# Patient Record
Sex: MALE | Race: WHITE | Employment: FULL TIME | URBAN - METROPOLITAN AREA
[De-identification: names, ages, dates, MRNs, and addresses within clinical notes are randomized per-mention and may not be internally consistent; named-entity substitution may affect disease eponyms.]

---

## 2018-07-31 ENCOUNTER — ANESTHESIA (OUTPATIENT)
Dept: SURGERY | Age: 53
End: 2018-07-31
Payer: COMMERCIAL

## 2018-07-31 ENCOUNTER — APPOINTMENT (OUTPATIENT)
Dept: CT IMAGING | Age: 53
End: 2018-07-31
Attending: PHYSICIAN ASSISTANT
Payer: COMMERCIAL

## 2018-07-31 ENCOUNTER — HOSPITAL ENCOUNTER (OUTPATIENT)
Age: 53
Setting detail: OBSERVATION
Discharge: HOME | End: 2018-08-02
Attending: EMERGENCY MEDICINE | Admitting: SURGERY
Payer: COMMERCIAL

## 2018-07-31 ENCOUNTER — ANESTHESIA EVENT (OUTPATIENT)
Dept: SURGERY | Age: 53
End: 2018-07-31
Payer: COMMERCIAL

## 2018-07-31 DIAGNOSIS — K35.80 ACUTE APPENDICITIS, UNSPECIFIED ACUTE APPENDICITIS TYPE: Primary | ICD-10-CM

## 2018-07-31 DIAGNOSIS — K35.30 ACUTE APPENDICITIS WITH LOCALIZED PERITONITIS: ICD-10-CM

## 2018-07-31 PROBLEM — K37 APPENDICITIS: Status: ACTIVE | Noted: 2018-07-31

## 2018-07-31 LAB
ALBUMIN SERPL-MCNC: 3.9 G/DL (ref 3.5–5)
ALBUMIN/GLOB SERPL: 1.1 {RATIO} (ref 1.1–2.2)
ALP SERPL-CCNC: 54 U/L (ref 45–117)
ALT SERPL-CCNC: 61 U/L (ref 12–78)
ANION GAP SERPL CALC-SCNC: 5 MMOL/L (ref 5–15)
APPEARANCE UR: CLEAR
AST SERPL-CCNC: 24 U/L (ref 15–37)
BACTERIA URNS QL MICRO: NEGATIVE /HPF
BASOPHILS # BLD: 0 K/UL (ref 0–0.1)
BASOPHILS NFR BLD: 0 % (ref 0–1)
BILIRUB SERPL-MCNC: 1.4 MG/DL (ref 0.2–1)
BILIRUB UR QL: NEGATIVE
BUN SERPL-MCNC: 12 MG/DL (ref 6–20)
BUN/CREAT SERPL: 11 (ref 12–20)
CALCIUM SERPL-MCNC: 9.1 MG/DL (ref 8.5–10.1)
CHLORIDE SERPL-SCNC: 101 MMOL/L (ref 97–108)
CO2 SERPL-SCNC: 30 MMOL/L (ref 21–32)
COLOR UR: ABNORMAL
CREAT SERPL-MCNC: 1.11 MG/DL (ref 0.7–1.3)
DIFFERENTIAL METHOD BLD: ABNORMAL
EOSINOPHIL # BLD: 0 K/UL (ref 0–0.4)
EOSINOPHIL NFR BLD: 0 % (ref 0–7)
EPITH CASTS URNS QL MICRO: ABNORMAL /LPF
ERYTHROCYTE [DISTWIDTH] IN BLOOD BY AUTOMATED COUNT: 11.6 % (ref 11.5–14.5)
GLOBULIN SER CALC-MCNC: 3.7 G/DL (ref 2–4)
GLUCOSE SERPL-MCNC: 132 MG/DL (ref 65–100)
GLUCOSE UR STRIP.AUTO-MCNC: NEGATIVE MG/DL
HCT VFR BLD AUTO: 42.8 % (ref 36.6–50.3)
HGB BLD-MCNC: 15.2 G/DL (ref 12.1–17)
HGB UR QL STRIP: ABNORMAL
HYALINE CASTS URNS QL MICRO: ABNORMAL /LPF (ref 0–5)
IMM GRANULOCYTES # BLD: 0.1 K/UL (ref 0–0.04)
IMM GRANULOCYTES NFR BLD AUTO: 0 % (ref 0–0.5)
KETONES UR QL STRIP.AUTO: ABNORMAL MG/DL
LEUKOCYTE ESTERASE UR QL STRIP.AUTO: NEGATIVE
LIPASE SERPL-CCNC: 106 U/L (ref 73–393)
LYMPHOCYTES # BLD: 0.8 K/UL (ref 0.8–3.5)
LYMPHOCYTES NFR BLD: 5 % (ref 12–49)
MCH RBC QN AUTO: 31.3 PG (ref 26–34)
MCHC RBC AUTO-ENTMCNC: 35.5 G/DL (ref 30–36.5)
MCV RBC AUTO: 88.1 FL (ref 80–99)
MONOCYTES # BLD: 1.4 K/UL (ref 0–1)
MONOCYTES NFR BLD: 8 % (ref 5–13)
NEUTS SEG # BLD: 14.8 K/UL (ref 1.8–8)
NEUTS SEG NFR BLD: 86 % (ref 32–75)
NITRITE UR QL STRIP.AUTO: NEGATIVE
NRBC # BLD: 0 K/UL (ref 0–0.01)
NRBC BLD-RTO: 0 PER 100 WBC
PH UR STRIP: 6 [PH] (ref 5–8)
PLATELET # BLD AUTO: 164 K/UL (ref 150–400)
PMV BLD AUTO: 9.8 FL (ref 8.9–12.9)
POTASSIUM SERPL-SCNC: 3.8 MMOL/L (ref 3.5–5.1)
PROT SERPL-MCNC: 7.6 G/DL (ref 6.4–8.2)
PROT UR STRIP-MCNC: 30 MG/DL
RBC # BLD AUTO: 4.86 M/UL (ref 4.1–5.7)
RBC #/AREA URNS HPF: ABNORMAL /HPF (ref 0–5)
SODIUM SERPL-SCNC: 136 MMOL/L (ref 136–145)
SP GR UR REFRACTOMETRY: 1.02 (ref 1–1.03)
UR CULT HOLD, URHOLD: NORMAL
UROBILINOGEN UR QL STRIP.AUTO: 1 EU/DL (ref 0.2–1)
WBC # BLD AUTO: 17.2 K/UL (ref 4.1–11.1)
WBC URNS QL MICRO: ABNORMAL /HPF (ref 0–4)

## 2018-07-31 PROCEDURE — 74011250636 HC RX REV CODE- 250/636: Performed by: SURGERY

## 2018-07-31 PROCEDURE — 96361 HYDRATE IV INFUSION ADD-ON: CPT

## 2018-07-31 PROCEDURE — 77030018836 HC SOL IRR NACL ICUM -A: Performed by: SURGERY

## 2018-07-31 PROCEDURE — 81001 URINALYSIS AUTO W/SCOPE: CPT | Performed by: PHYSICIAN ASSISTANT

## 2018-07-31 PROCEDURE — 96365 THER/PROPH/DIAG IV INF INIT: CPT

## 2018-07-31 PROCEDURE — 74011250636 HC RX REV CODE- 250/636

## 2018-07-31 PROCEDURE — 74011250636 HC RX REV CODE- 250/636: Performed by: PHYSICIAN ASSISTANT

## 2018-07-31 PROCEDURE — 77030008771 HC TU NG SALEM SUMP -A: Performed by: ANESTHESIOLOGY

## 2018-07-31 PROCEDURE — 77030008684 HC TU ET CUF COVD -B: Performed by: ANESTHESIOLOGY

## 2018-07-31 PROCEDURE — 96375 TX/PRO/DX INJ NEW DRUG ADDON: CPT

## 2018-07-31 PROCEDURE — 77030008602 HC TRCR ENDOSC EPATH J&J -B: Performed by: SURGERY

## 2018-07-31 PROCEDURE — 74011000258 HC RX REV CODE- 258: Performed by: PHYSICIAN ASSISTANT

## 2018-07-31 PROCEDURE — 77030020704 HC DISECT ENDOSC BLNT J&J -B: Performed by: SURGERY

## 2018-07-31 PROCEDURE — 99284 EMERGENCY DEPT VISIT MOD MDM: CPT

## 2018-07-31 PROCEDURE — 77030002933 HC SUT MCRYL J&J -A: Performed by: SURGERY

## 2018-07-31 PROCEDURE — 74011636320 HC RX REV CODE- 636/320: Performed by: RADIOLOGY

## 2018-07-31 PROCEDURE — 76060000033 HC ANESTHESIA 1 TO 1.5 HR: Performed by: SURGERY

## 2018-07-31 PROCEDURE — 76010000149 HC OR TIME 1 TO 1.5 HR: Performed by: SURGERY

## 2018-07-31 PROCEDURE — 99218 HC RM OBSERVATION: CPT

## 2018-07-31 PROCEDURE — 77030039266 HC ADH SKN EXOFIN S2SG -A: Performed by: SURGERY

## 2018-07-31 PROCEDURE — 96376 TX/PRO/DX INJ SAME DRUG ADON: CPT

## 2018-07-31 PROCEDURE — 74177 CT ABD & PELVIS W/CONTRAST: CPT

## 2018-07-31 PROCEDURE — 83690 ASSAY OF LIPASE: CPT | Performed by: PHYSICIAN ASSISTANT

## 2018-07-31 PROCEDURE — 77030018004 HC RELD STPLR ENDOSC1 J&J -C: Performed by: SURGERY

## 2018-07-31 PROCEDURE — 77030008756 HC TU IRR SUC STRY -B: Performed by: SURGERY

## 2018-07-31 PROCEDURE — 80053 COMPREHEN METABOLIC PANEL: CPT | Performed by: PHYSICIAN ASSISTANT

## 2018-07-31 PROCEDURE — 36415 COLL VENOUS BLD VENIPUNCTURE: CPT | Performed by: EMERGENCY MEDICINE

## 2018-07-31 PROCEDURE — 77030011810 HC STPLR ENDOSC J&J -G: Performed by: SURGERY

## 2018-07-31 PROCEDURE — 77030032490 HC SLV COMPR SCD KNE COVD -B: Performed by: SURGERY

## 2018-07-31 PROCEDURE — 77030003580 HC NDL INSUF VERES J&J -B: Performed by: SURGERY

## 2018-07-31 PROCEDURE — 77030008603 HC TRCR ENDOSC EPATH J&J -C: Performed by: SURGERY

## 2018-07-31 PROCEDURE — 77030019908 HC STETH ESOPH SIMS -A: Performed by: ANESTHESIOLOGY

## 2018-07-31 PROCEDURE — 85025 COMPLETE CBC W/AUTO DIFF WBC: CPT | Performed by: PHYSICIAN ASSISTANT

## 2018-07-31 PROCEDURE — 76210000006 HC OR PH I REC 0.5 TO 1 HR: Performed by: SURGERY

## 2018-07-31 PROCEDURE — 77030031139 HC SUT VCRL2 J&J -A: Performed by: SURGERY

## 2018-07-31 PROCEDURE — 74011000250 HC RX REV CODE- 250

## 2018-07-31 PROCEDURE — 77030011640 HC PAD GRND REM COVD -A: Performed by: SURGERY

## 2018-07-31 RX ORDER — OXYCODONE AND ACETAMINOPHEN 5; 325 MG/1; MG/1
1 TABLET ORAL
Status: DISCONTINUED | OUTPATIENT
Start: 2018-07-31 | End: 2018-08-01

## 2018-07-31 RX ORDER — ACETAMINOPHEN 325 MG/1
650 TABLET ORAL
Status: DISCONTINUED | OUTPATIENT
Start: 2018-07-31 | End: 2018-08-02 | Stop reason: HOSPADM

## 2018-07-31 RX ORDER — BISMUTH SUBSALICYLATE 262 MG
1 TABLET,CHEWABLE ORAL DAILY
COMMUNITY

## 2018-07-31 RX ORDER — OXYCODONE AND ACETAMINOPHEN 5; 325 MG/1; MG/1
1-2 TABLET ORAL
Status: DISCONTINUED | OUTPATIENT
Start: 2018-07-31 | End: 2018-07-31

## 2018-07-31 RX ORDER — HYDROMORPHONE HYDROCHLORIDE 2 MG/ML
INJECTION, SOLUTION INTRAMUSCULAR; INTRAVENOUS; SUBCUTANEOUS
Status: COMPLETED
Start: 2018-07-31 | End: 2018-07-31

## 2018-07-31 RX ORDER — ONDANSETRON 2 MG/ML
4 INJECTION INTRAMUSCULAR; INTRAVENOUS
Status: DISCONTINUED | OUTPATIENT
Start: 2018-07-31 | End: 2018-08-02 | Stop reason: HOSPADM

## 2018-07-31 RX ORDER — SODIUM CHLORIDE, SODIUM LACTATE, POTASSIUM CHLORIDE, CALCIUM CHLORIDE 600; 310; 30; 20 MG/100ML; MG/100ML; MG/100ML; MG/100ML
125 INJECTION, SOLUTION INTRAVENOUS CONTINUOUS
Status: DISCONTINUED | OUTPATIENT
Start: 2018-07-31 | End: 2018-08-02 | Stop reason: HOSPADM

## 2018-07-31 RX ORDER — HYDROMORPHONE HYDROCHLORIDE 2 MG/ML
0.5 INJECTION, SOLUTION INTRAMUSCULAR; INTRAVENOUS; SUBCUTANEOUS
Status: COMPLETED | OUTPATIENT
Start: 2018-07-31 | End: 2018-07-31

## 2018-07-31 RX ORDER — PROPOFOL 10 MG/ML
INJECTION, EMULSION INTRAVENOUS AS NEEDED
Status: DISCONTINUED | OUTPATIENT
Start: 2018-07-31 | End: 2018-08-01 | Stop reason: HOSPADM

## 2018-07-31 RX ORDER — ACETAMINOPHEN 325 MG/1
650 TABLET ORAL
COMMUNITY
End: 2018-08-02

## 2018-07-31 RX ORDER — MORPHINE SULFATE 4 MG/ML
4 INJECTION INTRAVENOUS
Status: COMPLETED | OUTPATIENT
Start: 2018-07-31 | End: 2018-07-31

## 2018-07-31 RX ORDER — OXYCODONE AND ACETAMINOPHEN 5; 325 MG/1; MG/1
2 TABLET ORAL
Status: DISCONTINUED | OUTPATIENT
Start: 2018-07-31 | End: 2018-08-01

## 2018-07-31 RX ORDER — SUCCINYLCHOLINE CHLORIDE 20 MG/ML
INJECTION INTRAMUSCULAR; INTRAVENOUS AS NEEDED
Status: DISCONTINUED | OUTPATIENT
Start: 2018-07-31 | End: 2018-08-01 | Stop reason: HOSPADM

## 2018-07-31 RX ORDER — HYDROMORPHONE HYDROCHLORIDE 2 MG/ML
2 INJECTION, SOLUTION INTRAMUSCULAR; INTRAVENOUS; SUBCUTANEOUS
Status: DISCONTINUED | OUTPATIENT
Start: 2018-07-31 | End: 2018-08-01

## 2018-07-31 RX ORDER — HYDROMORPHONE HYDROCHLORIDE 2 MG/ML
1 INJECTION, SOLUTION INTRAMUSCULAR; INTRAVENOUS; SUBCUTANEOUS
Status: COMPLETED | OUTPATIENT
Start: 2018-07-31 | End: 2018-07-31

## 2018-07-31 RX ORDER — SODIUM CHLORIDE, SODIUM LACTATE, POTASSIUM CHLORIDE, CALCIUM CHLORIDE 600; 310; 30; 20 MG/100ML; MG/100ML; MG/100ML; MG/100ML
150 INJECTION, SOLUTION INTRAVENOUS CONTINUOUS
Status: DISCONTINUED | OUTPATIENT
Start: 2018-08-01 | End: 2018-08-01

## 2018-07-31 RX ORDER — ROCURONIUM BROMIDE 10 MG/ML
INJECTION, SOLUTION INTRAVENOUS AS NEEDED
Status: DISCONTINUED | OUTPATIENT
Start: 2018-07-31 | End: 2018-08-01 | Stop reason: HOSPADM

## 2018-07-31 RX ORDER — ONDANSETRON 2 MG/ML
4 INJECTION INTRAMUSCULAR; INTRAVENOUS
Status: DISPENSED | OUTPATIENT
Start: 2018-07-31 | End: 2018-08-01

## 2018-07-31 RX ORDER — FENTANYL CITRATE 50 UG/ML
INJECTION, SOLUTION INTRAMUSCULAR; INTRAVENOUS AS NEEDED
Status: DISCONTINUED | OUTPATIENT
Start: 2018-07-31 | End: 2018-08-01 | Stop reason: HOSPADM

## 2018-07-31 RX ORDER — MIDAZOLAM HYDROCHLORIDE 1 MG/ML
INJECTION, SOLUTION INTRAMUSCULAR; INTRAVENOUS AS NEEDED
Status: DISCONTINUED | OUTPATIENT
Start: 2018-07-31 | End: 2018-08-01 | Stop reason: HOSPADM

## 2018-07-31 RX ORDER — LIDOCAINE HYDROCHLORIDE 20 MG/ML
INJECTION, SOLUTION EPIDURAL; INFILTRATION; INTRACAUDAL; PERINEURAL AS NEEDED
Status: DISCONTINUED | OUTPATIENT
Start: 2018-07-31 | End: 2018-08-01 | Stop reason: HOSPADM

## 2018-07-31 RX ADMIN — SODIUM CHLORIDE 3.38 G: 900 INJECTION, SOLUTION INTRAVENOUS at 18:53

## 2018-07-31 RX ADMIN — FENTANYL CITRATE 50 MCG: 50 INJECTION, SOLUTION INTRAMUSCULAR; INTRAVENOUS at 23:42

## 2018-07-31 RX ADMIN — FENTANYL CITRATE 100 MCG: 50 INJECTION, SOLUTION INTRAMUSCULAR; INTRAVENOUS at 23:45

## 2018-07-31 RX ADMIN — HYDROMORPHONE HYDROCHLORIDE 0.5 MG: 2 INJECTION, SOLUTION INTRAMUSCULAR; INTRAVENOUS; SUBCUTANEOUS at 20:18

## 2018-07-31 RX ADMIN — ROCURONIUM BROMIDE 25 MG: 10 INJECTION, SOLUTION INTRAVENOUS at 23:45

## 2018-07-31 RX ADMIN — MORPHINE SULFATE 4 MG: 4 INJECTION INTRAVENOUS at 16:26

## 2018-07-31 RX ADMIN — SODIUM CHLORIDE, SODIUM LACTATE, POTASSIUM CHLORIDE, AND CALCIUM CHLORIDE 125 ML/HR: 600; 310; 30; 20 INJECTION, SOLUTION INTRAVENOUS at 20:37

## 2018-07-31 RX ADMIN — HYDROMORPHONE HYDROCHLORIDE 0.5 MG: 2 INJECTION, SOLUTION INTRAMUSCULAR; INTRAVENOUS; SUBCUTANEOUS at 23:45

## 2018-07-31 RX ADMIN — FENTANYL CITRATE 50 MCG: 50 INJECTION, SOLUTION INTRAMUSCULAR; INTRAVENOUS at 23:34

## 2018-07-31 RX ADMIN — IOPAMIDOL 100 ML: 755 INJECTION, SOLUTION INTRAVENOUS at 18:08

## 2018-07-31 RX ADMIN — ROCURONIUM BROMIDE 5 MG: 10 INJECTION, SOLUTION INTRAVENOUS at 23:40

## 2018-07-31 RX ADMIN — LIDOCAINE HYDROCHLORIDE 40 MG: 20 INJECTION, SOLUTION EPIDURAL; INFILTRATION; INTRACAUDAL; PERINEURAL at 23:40

## 2018-07-31 RX ADMIN — HYDROMORPHONE HYDROCHLORIDE 2 MG: 2 INJECTION, SOLUTION INTRAMUSCULAR; INTRAVENOUS; SUBCUTANEOUS at 22:12

## 2018-07-31 RX ADMIN — SODIUM CHLORIDE, SODIUM LACTATE, POTASSIUM CHLORIDE, AND CALCIUM CHLORIDE: 600; 310; 30; 20 INJECTION, SOLUTION INTRAVENOUS at 23:30

## 2018-07-31 RX ADMIN — SUCCINYLCHOLINE CHLORIDE 100 MG: 20 INJECTION INTRAMUSCULAR; INTRAVENOUS at 23:40

## 2018-07-31 RX ADMIN — SODIUM CHLORIDE 1000 ML: 900 INJECTION, SOLUTION INTRAVENOUS at 16:30

## 2018-07-31 RX ADMIN — MIDAZOLAM HYDROCHLORIDE 2 MG: 1 INJECTION, SOLUTION INTRAMUSCULAR; INTRAVENOUS at 23:34

## 2018-07-31 RX ADMIN — HYDROMORPHONE HYDROCHLORIDE 0.5 MG: 2 INJECTION, SOLUTION INTRAMUSCULAR; INTRAVENOUS; SUBCUTANEOUS at 18:50

## 2018-07-31 RX ADMIN — PROPOFOL 200 MG: 10 INJECTION, EMULSION INTRAVENOUS at 23:40

## 2018-07-31 RX ADMIN — HYDROMORPHONE HYDROCHLORIDE 1 MG: 2 INJECTION, SOLUTION INTRAMUSCULAR; INTRAVENOUS; SUBCUTANEOUS at 17:14

## 2018-07-31 NOTE — IP AVS SNAPSHOT
[address] 
[phone] Patient: Namita Rosas MRN: [id number] :1965 A check kanchan indicates which time of day the medication should be taken. My Medications START taking these medications Instructions Each Dose to Equal  
 Morning Noon Evening Bedtime  
 amoxicillin-clavulanate 875-125 mg per tablet Commonly known as:  AUGMENTIN Take 1 Tab by mouth two (2) times a day. 1 Tab  
    
   
   
   
  
 oxyCODONE-acetaminophen 5-325 mg per tablet Commonly known as:  PERCOCET Your last dose was:  2018 
6:38am  
   
 Take 1-2 Tabs by mouth every four (4) hours as needed for Pain. Max Daily Amount: 12 Tabs. 1-2 Tab CONTINUE taking these medications Instructions Each Dose to Equal  
 Morning Noon Evening Bedtime  
 multivitamin tablet Commonly known as:  ONE A DAY Take 1 Tab by mouth daily. 1 Tab STOP taking these medications   
 acetaminophen 325 mg tablet Commonly known as:  TYLENOL Where to Get Your Medications Information on where to get these meds will be given to you by the nurse or doctor. ! Ask your nurse or doctor about these medications  
  amoxicillin-clavulanate 875-125 mg per tablet  
 oxyCODONE-acetaminophen 5-325 mg per tablet

## 2018-07-31 NOTE — PROGRESS NOTES
[facility]: MED RECONCILIATION Information obtained from: patient, no Rx query on file Significant PMH/Disease States: History reviewed. No pertinent past medical history. Chief Complaint for this Admission: Chief Complaint Patient presents with  Abdominal Pain Allergies: Review of patient's allergies indicates no known allergies. Prior to Admission Medications:  
 
Medication Documentation Review Audit Reviewed by Kassandra Cheek PHARMD (Pharmacist) on 07/31/18 at [address] [place] [address] Medication Sig Documenting Provider Last Dose Status Taking?  
 
 acetaminophen (TYLENOL) 325 mg tablet Take 650 mg by mouth every four (4) hours as needed for Pain. Historical Provider 7/31/2018 Unknown time Active Yes  
 multivitamin (ONE A DAY) tablet Take 1 Tab by mouth daily. Historical Provider 7/30/2018 am Active Yes Thank you for the consult, 
Wyatt WHIPPLE, [address]

## 2018-07-31 NOTE — IP AVS SNAPSHOT
[facility] 
 
 
 [address] 
[phone] Patient: Christian Yuan MRN: [id number] :1965 About your hospitalization You were admitted on:  2018 You last received care in the:  [facility] 4M POST SURG ORT 1 You were discharged on:  2018 Why you were hospitalized Your primary diagnosis was:  Not on File Your diagnoses also included:  Appendicitis Follow-up Information Follow up With Details Comments Contact Info Jeff Whipple MD In 2 weeks  [place] [address] 
[phone] None   None ([phone]) Patient stated that they have no PCP Discharge Orders None A check kanchan indicates which time of day the medication should be taken. My Medications START taking these medications Instructions Each Dose to Equal  
 Morning Noon Evening Bedtime  
 amoxicillin-clavulanate 875-125 mg per tablet Commonly known as:  AUGMENTIN Take 1 Tab by mouth two (2) times a day. 1 Tab  
    
   
   
   
  
 oxyCODONE-acetaminophen 5-325 mg per tablet Commonly known as:  PERCOCET Your last dose was:  2018 
6:38am  
   
 Take 1-2 Tabs by mouth every four (4) hours as needed for Pain. Max Daily Amount: 12 Tabs. 1-2 Tab CONTINUE taking these medications Instructions Each Dose to Equal  
 Morning Noon Evening Bedtime  
 multivitamin tablet Commonly known as:  ONE A DAY Take 1 Tab by mouth daily. 1 Tab STOP taking these medications   
 acetaminophen 325 mg tablet Commonly known as:  TYLENOL Where to Get Your Medications Information on where to get these meds will be given to you by the nurse or doctor. ! Ask your nurse or doctor about these medications  
  amoxicillin-clavulanate 875-125 mg per tablet oxyCODONE-acetaminophen 5-325 mg per tablet Opioid Education Prescription Opioids: What You Need to Know: 
 
  
FOLLOW-UP: Please make an appointment with your physician in 2 week(s). Call your physician immediately if you have any fevers greater than 102.5, drainage from your wound that is not clear or looks infected, persistent bleeding, increasing abdominal pain, problems urinating, or persistent nausea/vomiting. You should be aware that you may have shoulder pain after surgery and that this will progressively go away. This is called 'referred pain' and is caused by gas that may be trapped under the diaphragm from the surgery, especially if it was performed laparoscopically through mini-incisions. This gas will progressively get reabsorbed by your body.  
  
WOUND CARE INSTRUCTIONS:  Dermabond liquid skin bandage applied. You may shower. Do not scrape off. Once the wound has quit draining you may leave it open to air. If clothing rubs against the wound or causes irritation and the wound is not draining you may cover it with a dry dressing during the daytime. Try to keep the wound dry and avoid ointments on the wound unless directed to do so. If the wound becomes bright red and painful or starts to drain infected material that is not clear, please contact your physician immediately. You should also call if you begin to drain fluid that is thin and greenish-brown from the wound and appears to look like bile. If the wound though is mildly pink and has a thick firm ridge underneath it, this is normal, and is referred to as a healing ridge.  This will resolve over the next 4-6 weeks. 
  
 DIET: You may eat any foods that you can tolerate. It is a good idea to eat a high fiber diet and take in plenty of fluids to prevent constipation. If you do become constipated you may want to take a mild laxative or take ducolax tablets on a daily basis until your bowel habits are regular. Constipation can be very uncomfortable, along with straining, after recent abdominal surgery. Can take Miralax 1 cap full daily for constipation. 
  
ACTIVITY: You are encouraged to cough and deep breath or use your incentive spirometer if you were given one, every 15-30 minutes when awake. This will help prevent respiratory complications and low grade fevers post-operatively. You may want to hug a pillow when coughing and sneezing to add additional support to the surgical area(s) which will decrease pain during these times. You are encouraged to walk and engage in light activity for the next two weeks. You should not lift more than 20 pounds during this time frame as it could put you at increased risk for a post-operative hernia. Twenty pounds is roughly equivalent to a plastic bag of groceries.  
  
MEDICATIONS: Try to take narcotic medications and anti-inflammatory medications, such as tylenol, ibuprofen, naprosyn, etc., with food. This will minimize stomach upset from the medication. Should you develop nausea and vomiting from the pain medication, or develop a rash, please discontinue the medication and contact your physician. You should not drive, make important decisions, or operate machinery when taking narcotic pain medication. 
  
QUESTIONS: Please feel free to call your physician or the hospital  if you have any questions, and they will be glad to assist you. [employer] Announcement We are excited to announce that we are making your provider's discharge notes available to you in [employer].   You will see these notes when they are completed and signed by the physician that discharged you from your recent hospital stay. If you have any questions or concerns about any information you see in [employer], please call the Health Information Department where you were seen or reach out to your Primary Care Provider for more information about your plan of care. Introducing [facility]! Sundayreynabrittani Jenniferleopoldo introduces [employer] patient portal. Now you can access parts of your medical record, email your doctor's office, and request medication refills online. 1. In your internet browser, go to [email or web address] 2. Click on the First Time User? Click Here link in the Sign In box. You will see the New Member Sign Up page. 3. Enter your [employer] Access Code exactly as it appears below. You will not need to use this code after youve completed the sign-up process. If you do not sign up before the expiration date, you must request a new code. · [employer] Access Code: [email or web address] Expires: 10/29/2018  4:12 PM 
 
4. Enter the last four digits of your Social Security Number (xxxx) and Date of Birth (mm/dd/yyyy) as indicated and click Submit. You will be taken to the next sign-up page. 5. Create a [employer] ID. This will be your [employer] login ID and cannot be changed, so think of one that is secure and easy to remember. 6. Create a [employer] password. You can change your password at any time. 7. Enter your Password Reset Question and Answer. This can be used at a later time if you forget your password. 8. Enter your e-mail address. You will receive e-mail notification when new information is available in [address] 9. Click Sign Up. You can now view and download portions of your medical record. 10. Click the Download Summary menu link to download a portable copy of your medical information.  
 
If you have questions, please visit the Frequently Asked Questions section of the [employer]. Remember, MyChart is NOT to be used for urgent needs. For medical emergencies, dial 911. Now available from your iPhone and Android! Introducing Benito Bedoya As a [facility] patient, I wanted to make you aware of our electronic visit tool called [facility]. [facility] 24/7 allows you to connect within minutes with a medical provider 24 hours a day, seven days a week via a mobile device or tablet or logging into a secure website from your computer. You can access Benito Bedoya from anywhere in the [place]. A virtual visit might be right for you when you have a simple condition and feel like you just dont want to get out of bed, or cant get away from work for an appointment, when your regular [facility] provider is not available (evenings, weekends or holidays), or when youre out of town and need minor care. Electronic visits cost only $49 and if the [facility] 24/7 provider determines a prescription is needed to treat your condition, one can be electronically transmitted to a nearby pharmacy*. Please take a moment to enroll today if you have not already done so. The enrollment process is free and takes just a few minutes. To enroll, please download the [facility] 24/7 rick to your tablet or phone, or visit [email or web address] to enroll on your computer. And, as an [address] [place] [address] patient with a Fuel ([email or web address]) account, the results of your visits will be scanned into your electronic medical record and your primary care provider will be able to view the scanned results. We urge you to continue to see your regular [facility] provider for your ongoing medical care. And while your primary care provider may not be the one available when you seek a Benito Bedoya virtual visit, the peace of mind you get from getting a real diagnosis real time can be priceless. For more information on Benito Bedoya, view our Frequently Asked Questions (FAQs) at [email or web address]. Sincerely, 
 
Marielle Lara MD 
Chief Medical Officer [employer] *:  certain medications cannot be prescribed via Benito Bedoya Providers Seen During Your Hospitalization Provider Specialty Primary office phone Cris Platt MD Emergency Medicine [phone] Hermilo Roberts, [address] [place] [address] Surgery [phone] He Awad MD General Surgery [phone] Your Primary Care Physician (PCP) Primary Care Physician Office Phone Office Fax NONE ** None ** ** None ** You are allergic to the following No active allergies Recent Documentation Height Weight BMI Smoking Status 1.753 m 95.3 kg 31.01 kg/m2 Never Smoker Emergency Contacts Name Discharge Info Relation Home Work Sponto   [phone] Patient Belongings The following personal items are in your possession at time of discharge: 
  Dental Appliances: None  Visual Aid: None Please provide this summary of care documentation to your next provider. Signatures-by signing, you are acknowledging that this After Visit Summary has been reviewed with you and you have received a copy. Patient Signature:  ____________________________________________________________ Date:  ____________________________________________________________  
  
Renetta Dallas Provider Signature:  ____________________________________________________________ Date:  ____________________________________________________________

## 2018-07-31 NOTE — ED TRIAGE NOTES
Pt c/o RLQ pain since last night. Denies N/v/d. Pt went to [facility] and was told to come to ED. Pt took nothing for pain PTA.

## 2018-07-31 NOTE — ED PROVIDER NOTES
HPI Comments: Marla De León is a 47 y.o. male  who presents by private vehicle to ER with c/o Patient presents with: 
Abdominal Pain Patient presents with complaints of RLQ abdominal pain that started yesterday. Patient reports nausea and vomiting. Denies diarrhea. Patient reports subjective fever. Patient denies any significant medical or surgical history. He specifically denies any  chest pain, shortness of breath, headache, rash, diarrhea,  urinary/bowel changes, sweating or weight loss. PCP: No primary care provider on file. PMHx significant for: History reviewed. No pertinent past medical history. PSHx significant for: History reviewed. No pertinent surgical history. Social Hx: Tobacco use: Smoking status: Never Smoker Smokeless status: Never Used                     
; EtOH use: The patient states he drinks 0 per week.; Illicit Drug use: Allergies: 
No Known Allergies There are no other complaints, changes or physical findings at this time. Patient is a 47 y.o. male presenting with abdominal pain. The history is provided by the patient. Abdominal Pain This is a new problem. The current episode started yesterday. The problem occurs constantly. The problem has not changed since onset. The pain is located in the RLQ. The quality of the pain is sharp. The pain is at a severity of 9/10. The pain is severe. Associated symptoms include a fever, nausea and vomiting. Pertinent negatives include no anorexia, no belching, no diarrhea, no flatus, no hematochezia, no melena, no dysuria, no frequency, no hematuria, no headaches, no arthralgias, no myalgias, no trauma, no chest pain, no testicular pain and no back pain. Nothing worsens the pain. The pain is relieved by nothing. His past medical history does not include GERD, Crohn's disease, cancer, UTI or ectopic pregnancy. The patient's surgical history non-contributory.  
  
 
History reviewed. No pertinent past medical history. History reviewed. No pertinent surgical history. History reviewed. No pertinent family history. Social History Social History  Marital status: N/A Spouse name: N/A  
 Number of children: N/A  
 Years of education: N/A Occupational History  Not on file. Social History Main Topics  Smoking status: Never Smoker  Smokeless tobacco: Never Used  Alcohol use Yes Comment: social  
 Drug use: No  
 Sexual activity: Not on file Other Topics Concern  Not on file Social History Narrative  No narrative on file ALLERGIES: Review of patient's allergies indicates no known allergies. Review of Systems Constitutional: Positive for fever. HENT: Negative. Eyes: Negative. Respiratory: Negative. Cardiovascular: Negative. Negative for chest pain. Gastrointestinal: Positive for abdominal pain, nausea and vomiting. Negative for anorexia, diarrhea, flatus, hematochezia and melena. Endocrine: Negative. Genitourinary: Negative. Negative for dysuria, frequency, hematuria and testicular pain. Musculoskeletal: Negative. Negative for arthralgias, back pain and myalgias. Skin: Negative. Allergic/Immunologic: Negative. Neurological: Negative. Negative for headaches. Hematological: Negative. Psychiatric/Behavioral: Negative. All other systems reviewed and are negative. Vitals:  
 07/31/18 1611 BP: (!) 141/93 Pulse: 85 Resp: 16 Temp: 98.7 °F (37.1 °C) SpO2: 98% Weight: 49.9 kg (110 lb) Height: 5' 9\" (1.753 m) Physical Exam  
Constitutional: He is oriented to person, place, and time. He appears well-developed and well-nourished. HENT:  
Head: Normocephalic and atraumatic. Right Ear: External ear normal.  
Left Ear: External ear normal.  
Mouth/Throat: Oropharynx is clear and moist. No oropharyngeal exudate.   
Eyes: Conjunctivae and EOM are normal. Pupils are equal, round, and reactive to light. Right eye exhibits no discharge. Left eye exhibits no discharge. No scleral icterus. Neck: Normal range of motion. Neck supple. No tracheal deviation present. No thyromegaly present. Cardiovascular: Normal rate, regular rhythm, normal heart sounds and intact distal pulses. No murmur heard. Pulmonary/Chest: Effort normal and breath sounds normal. No respiratory distress. He has no wheezes. He has no rales. Abdominal: Soft. Bowel sounds are normal. He exhibits no distension. There is tenderness in the right lower quadrant. There is no rebound and no guarding. Musculoskeletal: Normal range of motion. He exhibits no edema or tenderness. Lymphadenopathy:  
  He has no cervical adenopathy. Neurological: He is alert and oriented to person, place, and time. No cranial nerve deficit. Coordination normal.  
Skin: Skin is warm. No rash noted. No erythema. Psychiatric: He has a normal mood and affect. His behavior is normal. Judgment and thought content normal.  
Nursing note and vitals reviewed. MDM Number of Diagnoses or Management Options Acute appendicitis, unspecified acute appendicitis type:  
Diagnosis management comments: 12:33 AM 
Patient is being admitted to the hospital.  The results of their tests and reasons for their admission have been discussed with them and/or available family. They convey agreement and understanding for the need to be admitted and for their admission diagnosis. Consultation has been made with the inpatient physician specialist for hospitalization. LABORATORY TESTS: 
Recent Results (from the past 12 hour(s)) -CBC WITH AUTOMATED DIFF Collection Time: 07/31/18  4:23 PM 
     Result                                            Value                         Ref Range      WBC                                               17.2 (H)                      4.1 - 11.1 K/uL           
     RBC 4.86                          4.10 - 5.70 M/uL HGB                                               15.2                          12.1 - 17.0 g/dL HCT                                               42.8                          36.6 - 50.3 % MCV                                               88.1                          80.0 - 99.0 FL            
     MCH                                               31.3                          26.0 - 34.0 PG            
     MCHC                                              35.5                          30.0 - 36.5 g/dL RDW                                               11.6                          11.5 - 14.5 % PLATELET                                          164                           150 - 400 K/uL MPV                                               9.8                           8.9 - 12.9 FL             
     NRBC                                              0.0                           0  WBC ABSOLUTE NRBC                                     0.00                          0.00 - 0.01 K/uL NEUTROPHILS                                       86 (H)                        32 - 75 % LYMPHOCYTES                                       5 (L)                         12 - 49 % MONOCYTES                                         8                             5 - 13 % EOSINOPHILS                                       0                             0 - 7 % BASOPHILS                                         0                             0 - 1 % IMMATURE GRANULOCYTES                             0                             0.0 - 0.5 % ABS.  NEUTROPHILS                                  14.8 (H) 1.8 - 8.0 K/UL            
     ABS. LYMPHOCYTES                                  0.8                           0.8 - 3.5 K/UL            
     ABS. MONOCYTES                                    1.4 (H)                       0.0 - 1.0 K/UL            
     ABS. EOSINOPHILS                                  0.0                           0.0 - 0.4 K/UL            
     ABS. BASOPHILS                                    0.0                           0.0 - 0.1 K/UL            
     ABS. IMM. GRANS.                                  0.1 (H)                       0.00 - 0.04 K/UL          
     DF                                                AUTOMATED                                               
-METABOLIC PANEL, COMPREHENSIVE Collection Time: 07/31/18  4:23 PM 
     Result                                            Value                         Ref Range Sodium                                            136                           136 - 145 mmol/L Potassium                                         3.8                           3.5 - 5.1 mmol/L Chloride                                          101                           97 - 108 mmol/L           
     CO2                                               30                            21 - 32 mmol/L Anion gap                                         5                             5 - 15 mmol/L Glucose                                           132 (H)                       65 - 100 mg/dL BUN                                               12                            6 - 20 MG/DL Creatinine                                        1.11                          0.70 - 1.30 MG/DL         
     BUN/Creatinine ratio                              11 (L)                        12 - 20      GFR est AA                                        >60 >60 ml/min/1.73m2 GFR est non-AA                                    >60                           >60 ml/min/1.73m2 Calcium                                           9.1                           8.5 - 10.1 MG/DL Bilirubin, total                                  1.4 (H)                       0.2 - 1.0 MG/DL           
     ALT (SGPT)                                        61                            12 - 78 U/L               
     AST (SGOT)                                        24                            15 - 37 U/L Alk. phosphatase                                  54                            45 - 117 U/L Protein, total                                    7.6                           6.4 - 8.2 g/dL Albumin                                           3.9                           3.5 - 5.0 g/dL Globulin                                          3.7                           2.0 - 4.0 g/dL A-G Ratio                                         1.1                           1.1 - 2.2                 
-LIPASE Collection Time: 07/31/18  4:23 PM 
     Result                                            Value                         Ref Range Lipase                                            106                           73 - 393 U/L              
-URINALYSIS W/MICROSCOPIC Collection Time: 07/31/18  6:55 PM 
     Result                                            Value                         Ref Range Color                                             YELLOW/STRAW Appearance                                        CLEAR                         CLEAR      Specific gravity                                  1.020                         1.003 - 1.030 pH (UA)                                           6.0                           5.0 - 8.0 Protein                                           30 (A)                        NEG mg/dL Glucose                                           NEGATIVE                      NEG mg/dL Ketone                                            TRACE (A)                     NEG mg/dL Bilirubin                                         NEGATIVE                      NEG Blood                                             MODERATE (A)                  NEG Urobilinogen                                      1.0                           0.2 - 1.0 EU/dL Nitrites                                          NEGATIVE                      NEG Leukocyte Esterase                                NEGATIVE                      NEG                       
     WBC                                               0-4                           0 - 4 /hpf                
     RBC                                               20-50                         0 - 5 /hpf Epithelial cells                                  FEW                           FEW /lpf Bacteria                                          NEGATIVE                      NEG /hpf Hyaline cast                                      0-2                           0 - 5 /lpf                
-URINE CULTURE HOLD SAMPLE Collection Time: 07/31/18  6:55 PM 
     Result                                            Value                         Ref Range Urine culture hold URINE ON HOLD IN MICROBIOLOGY DEPT FOR 3 DAYS.  IF UNPRESERVED URINE IS SUBMITTED, IT CANNOT BE USED FOR ADDITIONAL TESTING AFTER 24 HRS, RECOLLECTION WILL BE REQUIRED. IMAGING RESULTS: 
See chart MEDICATIONS GIVEN: 
Medications 
ondansetron (ZOFRAN) injection 4 mg (4 mg IntraVENous Refused 7/31/18 1622) lactated Ringers infusion ( IntraVENous New Bag 7/31/18 2330) HYDROmorphone (PF) (DILAUDID) injection 2 mg (2 mg IntraVENous Given 7/31/18 2212) 
acetaminophen (TYLENOL) tablet 650 mg (not administered) 
ondansetron (ZOFRAN) injection 4 mg (not administered) 
oxyCODONE-acetaminophen (PERCOCET) 5-325 mg per tablet 1 Tab (not administered) Or 
oxyCODONE-acetaminophen (PERCOCET) 5-325 mg per tablet 2 Tab (not administered) 
piperacillin-tazobactam (ZOSYN) 3.375 g in 0.9% sodium chloride (MBP/ADV) 100 mL ADV (not administered) 
sodium chloride 0.9 % bolus infusion 1,000 mL (0 mL IntraVENous IV Completed 7/31/18 1923) morphine injection 4 mg (4 mg IntraVENous Given 7/31/18 1626) HYDROmorphone (PF) (DILAUDID) injection 1 mg (1 mg IntraVENous Given 7/31/18 1714) iopamidol (ISOVUE-370) 76 % injection 100 mL (100 mL IntraVENous Given 7/31/18 1808) piperacillin-tazobactam (ZOSYN) 3.375 g in 0.9% sodium chloride (MBP/ADV) 100 mL ADV (3.375 g IntraVENous Given 7/31/18 1853) HYDROmorphone (PF) (DILAUDID) injection 0.5 mg (0.5 mg IntraVENous Given 7/31/18 1850) HYDROmorphone (PF) (DILAUDID) injection 0.5 mg (0.5 mg IntraVENous Given 7/31/18 2018) bupivacaine-EPINEPHrine (PF) (SENSORCAINE PF) 0.5 %-1:200,000 injection 150 mg (30 mL SubCUTAneous Given 8/1/18 0020) IMPRESSION: 
Acute appendicitis, unspecified acute appendicitis type  (primary encounter diagnosis) PLAN: 
1. Admit to hospital for appendicitis. Amount and/or Complexity of Data Reviewed Clinical lab tests: ordered and reviewed Tests in the radiology section of CPT®: reviewed and ordered Tests in the medicine section of CPT®: ordered and reviewed Discuss the patient with other providers: yes (Attending- Dr. Carlos Arroyo who agrees with plan) ED Course Procedures CONSULT NOTE:  
7:18 PM 
Reshma Brewster PA-C spoke with Dr. Gretchen hCapin, Specialty: General Surgery Discussed pt's hx, disposition, and available diagnostic and imaging results. Reviewed care plans. Consultant agrees with plans as outlined. Will see for admission.

## 2018-08-01 PROCEDURE — 74011250636 HC RX REV CODE- 250/636: Performed by: SURGERY

## 2018-08-01 PROCEDURE — 74011250637 HC RX REV CODE- 250/637: Performed by: SURGERY

## 2018-08-01 PROCEDURE — 74011000250 HC RX REV CODE- 250: Performed by: SURGERY

## 2018-08-01 PROCEDURE — 74011000258 HC RX REV CODE- 258: Performed by: SURGERY

## 2018-08-01 PROCEDURE — 99218 HC RM OBSERVATION: CPT

## 2018-08-01 PROCEDURE — 77030027138 HC INCENT SPIROMETER -A

## 2018-08-01 PROCEDURE — 77010033678 HC OXYGEN DAILY

## 2018-08-01 PROCEDURE — 74011250636 HC RX REV CODE- 250/636

## 2018-08-01 PROCEDURE — 88304 TISSUE EXAM BY PATHOLOGIST: CPT | Performed by: SURGERY

## 2018-08-01 PROCEDURE — 94760 N-INVAS EAR/PLS OXIMETRY 1: CPT

## 2018-08-01 RX ORDER — DIPHENHYDRAMINE HYDROCHLORIDE 50 MG/ML
12.5 INJECTION, SOLUTION INTRAMUSCULAR; INTRAVENOUS AS NEEDED
Status: DISCONTINUED | OUTPATIENT
Start: 2018-08-01 | End: 2018-08-01 | Stop reason: HOSPADM

## 2018-08-01 RX ORDER — HYDROMORPHONE HYDROCHLORIDE 1 MG/ML
0.5 INJECTION, SOLUTION INTRAMUSCULAR; INTRAVENOUS; SUBCUTANEOUS
Status: DISCONTINUED | OUTPATIENT
Start: 2018-08-01 | End: 2018-08-01 | Stop reason: HOSPADM

## 2018-08-01 RX ORDER — DIPHENHYDRAMINE HYDROCHLORIDE 50 MG/ML
12.5 INJECTION, SOLUTION INTRAMUSCULAR; INTRAVENOUS
Status: DISCONTINUED | OUTPATIENT
Start: 2018-08-01 | End: 2018-08-02 | Stop reason: HOSPADM

## 2018-08-01 RX ORDER — HYDROMORPHONE HYDROCHLORIDE 2 MG/ML
INJECTION, SOLUTION INTRAMUSCULAR; INTRAVENOUS; SUBCUTANEOUS AS NEEDED
Status: DISCONTINUED | OUTPATIENT
Start: 2018-07-31 | End: 2018-08-01 | Stop reason: HOSPADM

## 2018-08-01 RX ORDER — ONDANSETRON 2 MG/ML
INJECTION INTRAMUSCULAR; INTRAVENOUS AS NEEDED
Status: DISCONTINUED | OUTPATIENT
Start: 2018-08-01 | End: 2018-08-01 | Stop reason: HOSPADM

## 2018-08-01 RX ORDER — HYDROMORPHONE HYDROCHLORIDE 2 MG/ML
0.5 INJECTION, SOLUTION INTRAMUSCULAR; INTRAVENOUS; SUBCUTANEOUS
Status: DISCONTINUED | OUTPATIENT
Start: 2018-08-01 | End: 2018-08-02 | Stop reason: HOSPADM

## 2018-08-01 RX ORDER — OXYCODONE AND ACETAMINOPHEN 5; 325 MG/1; MG/1
2 TABLET ORAL
Status: DISCONTINUED | OUTPATIENT
Start: 2018-08-01 | End: 2018-08-02 | Stop reason: HOSPADM

## 2018-08-01 RX ORDER — ONDANSETRON 2 MG/ML
4 INJECTION INTRAMUSCULAR; INTRAVENOUS AS NEEDED
Status: DISCONTINUED | OUTPATIENT
Start: 2018-08-01 | End: 2018-08-01 | Stop reason: HOSPADM

## 2018-08-01 RX ORDER — SODIUM CHLORIDE 0.9 % (FLUSH) 0.9 %
5-10 SYRINGE (ML) INJECTION AS NEEDED
Status: DISCONTINUED | OUTPATIENT
Start: 2018-08-01 | End: 2018-08-02 | Stop reason: HOSPADM

## 2018-08-01 RX ORDER — BUPIVACAINE HYDROCHLORIDE AND EPINEPHRINE 5; 5 MG/ML; UG/ML
30 INJECTION, SOLUTION EPIDURAL; INTRACAUDAL; PERINEURAL ONCE
Status: COMPLETED | OUTPATIENT
Start: 2018-08-01 | End: 2018-08-01

## 2018-08-01 RX ORDER — KETOROLAC TROMETHAMINE 30 MG/ML
15 INJECTION, SOLUTION INTRAMUSCULAR; INTRAVENOUS EVERY 6 HOURS
Status: DISCONTINUED | OUTPATIENT
Start: 2018-08-01 | End: 2018-08-02

## 2018-08-01 RX ORDER — SODIUM CHLORIDE 0.9 % (FLUSH) 0.9 %
5-10 SYRINGE (ML) INJECTION EVERY 8 HOURS
Status: DISCONTINUED | OUTPATIENT
Start: 2018-08-01 | End: 2018-08-02 | Stop reason: HOSPADM

## 2018-08-01 RX ORDER — SODIUM CHLORIDE, SODIUM LACTATE, POTASSIUM CHLORIDE, CALCIUM CHLORIDE 600; 310; 30; 20 MG/100ML; MG/100ML; MG/100ML; MG/100ML
125 INJECTION, SOLUTION INTRAVENOUS CONTINUOUS
Status: DISCONTINUED | OUTPATIENT
Start: 2018-08-01 | End: 2018-08-01 | Stop reason: HOSPADM

## 2018-08-01 RX ORDER — OXYCODONE AND ACETAMINOPHEN 5; 325 MG/1; MG/1
1 TABLET ORAL
Status: DISCONTINUED | OUTPATIENT
Start: 2018-08-01 | End: 2018-08-02 | Stop reason: HOSPADM

## 2018-08-01 RX ORDER — ALBUTEROL SULFATE 0.83 MG/ML
2.5 SOLUTION RESPIRATORY (INHALATION) AS NEEDED
Status: DISCONTINUED | OUTPATIENT
Start: 2018-08-01 | End: 2018-08-01 | Stop reason: HOSPADM

## 2018-08-01 RX ORDER — DOCUSATE SODIUM 100 MG/1
100 CAPSULE, LIQUID FILLED ORAL DAILY
Status: DISCONTINUED | OUTPATIENT
Start: 2018-08-01 | End: 2018-08-02 | Stop reason: HOSPADM

## 2018-08-01 RX ORDER — HEPARIN SODIUM 5000 [USP'U]/ML
5000 INJECTION, SOLUTION INTRAVENOUS; SUBCUTANEOUS EVERY 8 HOURS
Status: DISCONTINUED | OUTPATIENT
Start: 2018-08-01 | End: 2018-08-02 | Stop reason: HOSPADM

## 2018-08-01 RX ADMIN — OXYCODONE HYDROCHLORIDE AND ACETAMINOPHEN 1 TABLET: 5; 325 TABLET ORAL at 23:48

## 2018-08-01 RX ADMIN — HEPARIN SODIUM 5000 UNITS: 5000 INJECTION INTRAVENOUS; SUBCUTANEOUS at 18:39

## 2018-08-01 RX ADMIN — DOCUSATE SODIUM 100 MG: 100 CAPSULE, LIQUID FILLED ORAL at 10:36

## 2018-08-01 RX ADMIN — SODIUM CHLORIDE 3.38 G: 900 INJECTION, SOLUTION INTRAVENOUS at 01:15

## 2018-08-01 RX ADMIN — OXYCODONE HYDROCHLORIDE AND ACETAMINOPHEN 1 TABLET: 5; 325 TABLET ORAL at 17:11

## 2018-08-01 RX ADMIN — SODIUM CHLORIDE 3.38 G: 900 INJECTION, SOLUTION INTRAVENOUS at 17:11

## 2018-08-01 RX ADMIN — FENTANYL CITRATE 50 MCG: 50 INJECTION, SOLUTION INTRAMUSCULAR; INTRAVENOUS at 00:00

## 2018-08-01 RX ADMIN — SODIUM CHLORIDE 3.38 G: 900 INJECTION, SOLUTION INTRAVENOUS at 23:50

## 2018-08-01 RX ADMIN — OXYCODONE HYDROCHLORIDE AND ACETAMINOPHEN 1 TABLET: 5; 325 TABLET ORAL at 04:04

## 2018-08-01 RX ADMIN — KETOROLAC TROMETHAMINE 15 MG: 30 INJECTION, SOLUTION INTRAMUSCULAR at 11:48

## 2018-08-01 RX ADMIN — ONDANSETRON 4 MG: 2 INJECTION INTRAMUSCULAR; INTRAVENOUS at 00:00

## 2018-08-01 RX ADMIN — Medication 10 ML: at 23:49

## 2018-08-01 RX ADMIN — HYDROMORPHONE HYDROCHLORIDE 2 MG: 2 INJECTION, SOLUTION INTRAMUSCULAR; INTRAVENOUS; SUBCUTANEOUS at 09:06

## 2018-08-01 RX ADMIN — KETOROLAC TROMETHAMINE 15 MG: 30 INJECTION, SOLUTION INTRAMUSCULAR at 18:40

## 2018-08-01 RX ADMIN — SODIUM CHLORIDE, SODIUM LACTATE, POTASSIUM CHLORIDE, AND CALCIUM CHLORIDE 125 ML/HR: 600; 310; 30; 20 INJECTION, SOLUTION INTRAVENOUS at 07:30

## 2018-08-01 RX ADMIN — HEPARIN SODIUM 5000 UNITS: 5000 INJECTION INTRAVENOUS; SUBCUTANEOUS at 10:36

## 2018-08-01 RX ADMIN — KETOROLAC TROMETHAMINE 15 MG: 30 INJECTION, SOLUTION INTRAMUSCULAR at 23:49

## 2018-08-01 RX ADMIN — SODIUM CHLORIDE 3.38 G: 900 INJECTION, SOLUTION INTRAVENOUS at 09:06

## 2018-08-01 RX ADMIN — OXYCODONE HYDROCHLORIDE AND ACETAMINOPHEN 1 TABLET: 5; 325 TABLET ORAL at 11:50

## 2018-08-01 RX ADMIN — OXYCODONE HYDROCHLORIDE AND ACETAMINOPHEN 1 TABLET: 5; 325 TABLET ORAL at 20:28

## 2018-08-01 RX ADMIN — SODIUM CHLORIDE, SODIUM LACTATE, POTASSIUM CHLORIDE, AND CALCIUM CHLORIDE 125 ML/HR: 600; 310; 30; 20 INJECTION, SOLUTION INTRAVENOUS at 15:16

## 2018-08-01 NOTE — PROGRESS NOTES
General Surgery Progress Note S: Poor pain control. Not using incentive spirometer. Patient Vitals for the past 24 hrs: 
 Temp Pulse Resp BP SpO2  
08/01/18 0810 99 °F (37.2 °C) (!) 109 24 115/75 94 % 08/01/18 0448 98.7 °F (37.1 °C) 100 28 131/76 96 % 08/01/18 0346 99.8 °F (37.7 °C) 96 (!) 36 131/89 95 % 08/01/18 0245 99.4 °F (37.4 °C) (!) 104 24 127/82 94 % 08/01/18 0139 99.3 °F (37.4 °C) (!) 120 20 122/80 90 % 08/01/18 0126 - (!) 112 - 133/80 95 % 08/01/18 0120 100.2 °F (37.9 °C) (!) 110 18 134/87 93 % 08/01/18 0115 - (!) 106 18 133/80 96 % 08/01/18 0110 - (!) 106 20 - 97 % 08/01/18 0105 - (!) 107 23 140/85 96 % 08/01/18 0104 100.4 °F (38 °C) - - - -  
08/01/18 0100 - (!) 102 19 138/87 100 % 08/01/18 0055 - 96 22 143/83 98 % 08/01/18 0051 100 °F (37.8 °C) - - - -  
08/01/18 0050 - 91 20 142/82 100 % 08/01/18 0046 (!) 101.8 °F (38.8 °C) - - 147/86 -  
08/01/18 0045 - 84 28 149/85 94 % 08/01/18 0042 - - - 147/86 -  
07/31/18 2215 99 °F (37.2 °C) (!) 115 22 134/83 91 %  
07/31/18 2130 - - - (!) 146/101 92 % 07/31/18 2100 - - - 129/79 95 % 07/31/18 2043 - - - - 90 % 07/31/18 2030 - - - 139/85 91 %  
07/31/18 2000 - - - 134/82 93 % 07/31/18 1930 - - - 122/75 96 % 07/31/18 1900 - - - 121/73 95 % 07/31/18 1847 - - - 123/84 97 % 07/31/18 1830 - - - - 93 % 07/31/18 1745 - - - - 93 % 07/31/18 1700 - - - (!) 148/101 95 % 07/31/18 1630 - - - 141/82 97 % 07/31/18 1623 - - - 151/79 96 % 07/31/18 1611 98.7 °F (37.1 °C) 85 16 (!) 141/93 98 % Date 07/31/18 0700 - 08/01/18 6521 08/01/18 0700 - 08/02/18 6741 Shift 3161-2231 1912-9540 24 Hour Total 0160-1295 6030-0824 24 Hour Total  
I 
N 
T 
A 
K 
E 
 I.V. 
(mL/kg/hr)  1268.8 
(1.1) 1268.8 
(0.6) Volume (lactated Ringers infusion)  1268.8 1268.8 Shift Total 
(mL/kg)  1268.8 
(13.3) 1268.8 
(13.3) O 
U T 
P 
U Nae Carney Urine (mL/kg/hr)  50 
(0) 50 
(0) Urine Voided  50 50  Shift Total 
(mL/kg)  50 
(0.5) 50 
(0.5) NET  1218.8 1218.8 Weight (kg) 49.9 95.3 95.3 95.3 95.3 95.3 Physical Exam: 
 
 
General: NAD, A&Ox3 Resp: CTAB 
CV:Tachycardia, regular rhythm Abdomen: soft, distended, appropriately tender. Incisions without signs of infection Extremity: warm, no edema Lab Results Component Value Date/Time WBC 17.2 (H) 07/31/2018 04:23 PM  
 HGB 15.2 07/31/2018 04:23 PM  
 HCT 42.8 07/31/2018 04:23 PM  
 PLATELET 144 09/60/0767 04:23 PM  
 MCV 88.1 07/31/2018 04:23 PM  
 
 
Lab Results Component Value Date/Time Sodium 136 07/31/2018 04:23 PM  
 Potassium 3.8 07/31/2018 04:23 PM  
 Chloride 101 07/31/2018 04:23 PM  
 CO2 30 07/31/2018 04:23 PM  
 Anion gap 5 07/31/2018 04:23 PM  
 Glucose 132 (H) 07/31/2018 04:23 PM  
 BUN 12 07/31/2018 04:23 PM  
 Creatinine 1.11 07/31/2018 04:23 PM  
 BUN/Creatinine ratio 11 (L) 07/31/2018 04:23 PM  
 GFR est AA >60 07/31/2018 04:23 PM  
 GFR est non-AA >60 07/31/2018 04:23 PM  
 Calcium 9.1 07/31/2018 04:23 PM  
 
  
No results found for: INR, PTMR, PTP, PT1, PT2 
 
 
 
A/P: 
47year-old male POD 1 s/p laparoscopic appendectomy. Continue Percocet changed to q4 hours. Added Toradol. IS, RT c/s Regular diet as tolerated Colace Continue Zosyn for intra-abdominal infection. Per Dr. Justin Ruiz appendix was gangrenous Heparin for DVT chemoprophylaxis May shower Ambulate, Activity ad elie Shana Lobo MD

## 2018-08-01 NOTE — ANESTHESIA PREPROCEDURE EVALUATION
Anesthetic History No history of anesthetic complications Review of Systems / Medical History Patient summary reviewed, nursing notes reviewed and pertinent labs reviewed Pulmonary Within defined limits Neuro/Psych Within defined limits Cardiovascular Within defined limits Exercise tolerance: >4 METS 
  
GI/Hepatic/Renal 
  
 
 
 
 
 
Comments: Acute appendicitis Endo/Other Obesity Other Findings Physical Exam 
 
Airway Mallampati: II 
 
Neck ROM: normal range of motion Mouth opening: Normal 
 
 Cardiovascular Rhythm: regular Rate: normal 
 
 
 
 Dental 
No notable dental hx Pulmonary Breath sounds clear to auscultation Abdominal 
 
 
 
 Other Findings Anesthetic Plan ASA: 2, emergent Anesthesia type: general 
 
 
 
 
Induction: Intravenous Anesthetic plan and risks discussed with: Patient and Spouse Informed consent obtained.

## 2018-08-01 NOTE — BRIEF OP NOTE
BRIEF OPERATIVE NOTE Date of Procedure: 7/31/2018 Preoperative Diagnosis: Acute appendicitis, unspecified acute appendicitis type [K35.80] Postoperative Diagnosis: Acute appendicitis, unspecified acute appendicitis type [K35.80] Procedure(s): LAPAROSCOPIC APPENDECTOMY Surgeon(s) and Role: Rangel Hughes MD - Primary Surgical Assistant: Chely May Surgical Staff: 
Circ-1: Michael Bull RN Scrub Tech-1: Yamile Hawk Surg Asst-1: King Rizo Event Time In Incision Start 2350 Incision Close Anesthesia: General  
Estimated Blood Loss: 40cc Specimens:  
ID Type Source Tests Collected by Time Destination 1 : Brooks Tejeda MD 8/1/2018 8713 Pathology Findings: gangrenous appendicitis Complications: none Implants: * No implants in log *

## 2018-08-01 NOTE — OP NOTES
[facility] 80  OPERATIVE REPORT    Jose Wang  MR#: [id number]  : 1965  ACCOUNT #: [de-identified]   DATE OF SERVICE: 2018    PREOPERATIVE DIAGNOSIS:  Acute appendicitis. POSTOPERATIVE DIAGNOSIS:  Acute appendicitis. PROCEDURE PERFORMED:  Laparoscopic appendectomy. SURGEON:  Tsering Finley MD    ASSISTANT:  Leann Garcia    ANESTHESIA:  General.    POSTOPERATIVE CONDITION:  Good. ESTIMATED BLOOD LOSS:  Minimal.    COUNTS:  Sponge and needle counts were correct. DRAINS:  There were no drains. COMPLICATIONS:  No complications. IMPLANTS:  none    SPECIMENS REMOVED:  Appendix. INTRAOPERATIVE FINDINGS:  Gangrenous changes in the appendix throughout its length. INDICATION FOR PROCEDURE:  A 52-year-old  man who presents with history of abdominal exam and CT scan findings consistent with acute appendicitis. He is now to undergo laparoscopic, possible open appendectomy. DESCRIPTION OF PROCEDURE:  After appropriate informed consent was obtained, the patient was brought to operating room and placed in supine position. General anesthesia induced. The abdomen prepped and draped in the usual sterile fashion. Patient had been given Zosyn IV in the emergency room. No additional antibiotics warranted were given. After adequate pneumoperitoneum was gained with the Veress needle, a 12 mm supraumbilical trocar was inserted, underneath was surveyed to exclude occult bowel injury and subsequently a 5 mm suprapubic and a 5 mm left lower quadrant trocar was placed under direct vision. Dissection was begun in the right lower quadrant. The terminal ileum and omentum were adherent to the appendix. These were gently mobilized off of it using blunt dissection. The appendix was revealed, it was noted to be thickened, inflamed and to have some gangrenous changes.   It was gently elevated superiorly and medially and the mesoappendix dissected free and divided with a load on the Endo-YOSELIN 45 mm stapler with a vascular load. The appendix was then seen right where it met the tenia coli at the base of the cecum and it was divided with a second load which was a bowel load on the Endo-YOSELIN 45 mm stapler right where it met the base of the cecum. Specimen was placed in an EndoCatch pouch for later extraction and the right lower quadrant triply irrigated and checked carefully for hemostasis. Trocars removed. Pneumoperitoneum was relieved and the specimen was extracted through the umbilical trocar site in EndoCatch pouch and sent for pathology. Wounds reinspected for hemostasis. The fascia at the umbilicus was reapproximated using interrupted figure-of-eight sutures of 0 Vicryl. There was a small umbilical hernia. The peritoneal fat was freed from the edge of the fascial defect and reduced into the peritoneum and then the hernia space obliterated during closure of the fascia. Wounds were irrigated and checked for hemostasis and infiltrated with local anesthetic and then they were closed using running absorbable sutures. The patient was released from the recovery room in stable condition.       Thank you for your kind referral.      MD ANITRA Dalton / ALEJANDRO  D: 08/01/2018 00:29     T: 08/01/2018 05:50  JOB #: [id number]  CC: Twana Carrel PA-C

## 2018-08-01 NOTE — PROGRESS NOTES
8/1/2018 10:48 AM 
Reason for Admission:   Appendicitis RRAT Score:      0 Plan for utilizing home health:      No HH needs indicated. Likelihood of Readmission:  [place] Transition of Care Plan:                   
 
CM met with pt for assessment. Demographics and PCP were confirmed. Pt is a 47year old,  male who lives in a private residence with his wife (4 exterior steps, 1 flight interior steps). Pt's physical address is [place] [address]. PTA, pt was able to complete ADLs without the use of DME. Pt has prescription drug coverage. OBS letter provided and explained, and pt requested to be switched to inpatient as he has no outpatient coverage. Pt's wife will provide transport upon discharge. Adrian Victorai MA Care Management Interventions PCP Verified by CM: Yes (None reported. Pt reports he uses an urgent care) Palliative Care Criteria Met (RRAT>21 & CHF Dx)?: No 
Mode of Transport at Discharge: Other (see comment) (Wife) MyChart Signup: No 
Discharge Durable Medical Equipment: No 
Physical Therapy Consult: No 
Occupational Therapy Consult: No 
Speech Therapy Consult: No 
Current Support Network: Lives with Spouse Confirm Follow Up Transport: Family Plan discussed with Pt/Family/Caregiver: Yes Discharge Location Discharge Placement: Home with family assistance

## 2018-08-01 NOTE — PROGRESS NOTES
Patient c/o upper chest pain with inhalation, no radiation or any other symptoms, Dr. Yanira Martinez aware of patients symptoms, encouraged to use incentive spirometer, ambulation and patient to sit in chair, pain medications changed from Q6 to Q4hrs prn,  Symptoms related to gas, will continue to assess.

## 2018-08-01 NOTE — PERIOP NOTES
TRANSFER - OUT REPORT: 
 
Verbal report given to Aziza SR RN(name) on Gus Neal  being transferred to [facility](unit) for routine post - op Report consisted of patients Situation, Background, Assessment and  
Recommendations(SBAR). Information from the following report(s) OR Summary, Procedure Summary, Intake/Output and MAR was reviewed with the receiving nurse. Opportunity for questions and clarification was provided. Patient transported with: 
 O2 @ 4 liters Registered Nurse

## 2018-08-01 NOTE — PROGRESS NOTES
Bedside shift change report given to Palma Vee RN (oncoming nurse) by Jesu Sadler RN (offgoing nurse). Report included the following information SBAR, Kardex, Procedure Summary, Intake/Output, MAR and Recent Results. ]

## 2018-08-01 NOTE — PROGRESS NOTES
Bedside and Verbal shift change report given to [facility] (oncoming nurse) by ELIZABETH Haas (offgoing nurse). Report given with SBAR, Kardex, OR Summary, Intake/Output, MAR and Recent Results.

## 2018-08-01 NOTE — ANESTHESIA POSTPROCEDURE EVALUATION
Post-Anesthesia Evaluation and Assessment Patient: Lucie Tolbert MRN: [id number]  SSN: [id number] YOB: 1965  Age: 47 y.o. Sex: male Cardiovascular Function/Vital Signs Visit Vitals  /83  Pulse 96  Temp 37.8 °C (100 °F)  Resp 22  
 Ht 5' 9\" (1.753 m)  Wt 95.3 kg (210 lb)  SpO2 98%  BMI 31.01 kg/m2 Patient is status post general anesthesia for Procedure(s): LAPAROSCOPIC APPENDECTOMY . Nausea/Vomiting: None Postoperative hydration reviewed and adequate. Pain: 
Pain Scale 1: Visual (08/01/18 0046) Pain Intensity 1: 0 (08/01/18 0046) Managed Neurological Status:  
Neuro (WDL): Exceptions to WDL (Pt very sleepy on arrival to pacu) (08/01/18 0046) At baseline Mental Status and Level of Consciousness: Arousable Pulmonary Status:  
O2 Device: Oxygen mask (08/01/18 0051) Adequate oxygenation and airway patent Complications related to anesthesia: None Post-anesthesia assessment completed. No concerns Signed By: Jefferson Crouch DO August 1, 2018

## 2018-08-02 VITALS
RESPIRATION RATE: 18 BRPM | DIASTOLIC BLOOD PRESSURE: 58 MMHG | BODY MASS INDEX: 31.1 KG/M2 | WEIGHT: 210 LBS | SYSTOLIC BLOOD PRESSURE: 120 MMHG | HEIGHT: 69 IN | HEART RATE: 110 BPM | TEMPERATURE: 98.4 F | OXYGEN SATURATION: 91 %

## 2018-08-02 LAB
ANION GAP SERPL CALC-SCNC: 8 MMOL/L (ref 5–15)
BASOPHILS # BLD: 0 K/UL (ref 0–0.1)
BASOPHILS NFR BLD: 0 % (ref 0–1)
BUN SERPL-MCNC: 18 MG/DL (ref 6–20)
BUN/CREAT SERPL: 14 (ref 12–20)
CALCIUM SERPL-MCNC: 8.1 MG/DL (ref 8.5–10.1)
CHLORIDE SERPL-SCNC: 99 MMOL/L (ref 97–108)
CO2 SERPL-SCNC: 30 MMOL/L (ref 21–32)
CREAT SERPL-MCNC: 1.31 MG/DL (ref 0.7–1.3)
DIFFERENTIAL METHOD BLD: ABNORMAL
EOSINOPHIL # BLD: 0.2 K/UL (ref 0–0.4)
EOSINOPHIL NFR BLD: 2 % (ref 0–7)
ERYTHROCYTE [DISTWIDTH] IN BLOOD BY AUTOMATED COUNT: 11.6 % (ref 11.5–14.5)
GLUCOSE SERPL-MCNC: 119 MG/DL (ref 65–100)
HCT VFR BLD AUTO: 37.1 % (ref 36.6–50.3)
HGB BLD-MCNC: 12.7 G/DL (ref 12.1–17)
IMM GRANULOCYTES # BLD: 0 K/UL
IMM GRANULOCYTES NFR BLD AUTO: 0 %
LYMPHOCYTES # BLD: 0.9 K/UL (ref 0.8–3.5)
LYMPHOCYTES NFR BLD: 7 % (ref 12–49)
MAGNESIUM SERPL-MCNC: 1.8 MG/DL (ref 1.6–2.4)
MCH RBC QN AUTO: 31 PG (ref 26–34)
MCHC RBC AUTO-ENTMCNC: 34.2 G/DL (ref 30–36.5)
MCV RBC AUTO: 90.5 FL (ref 80–99)
MONOCYTES # BLD: 0.7 K/UL (ref 0–1)
MONOCYTES NFR BLD: 6 % (ref 5–13)
NEUTS BAND NFR BLD MANUAL: 1 % (ref 0–6)
NEUTS SEG # BLD: 10.6 K/UL (ref 1.8–8)
NEUTS SEG NFR BLD: 84 % (ref 32–75)
NRBC # BLD: 0 K/UL (ref 0–0.01)
NRBC BLD-RTO: 0 PER 100 WBC
PLATELET # BLD AUTO: 115 K/UL (ref 150–400)
PMV BLD AUTO: 10.3 FL (ref 8.9–12.9)
POTASSIUM SERPL-SCNC: 3.6 MMOL/L (ref 3.5–5.1)
RBC # BLD AUTO: 4.1 M/UL (ref 4.1–5.7)
RBC MORPH BLD: ABNORMAL
SODIUM SERPL-SCNC: 137 MMOL/L (ref 136–145)
WBC # BLD AUTO: 12.4 K/UL (ref 4.1–11.1)

## 2018-08-02 PROCEDURE — 74011250636 HC RX REV CODE- 250/636: Performed by: SURGERY

## 2018-08-02 PROCEDURE — 99218 HC RM OBSERVATION: CPT

## 2018-08-02 PROCEDURE — 80048 BASIC METABOLIC PNL TOTAL CA: CPT | Performed by: SURGERY

## 2018-08-02 PROCEDURE — 85025 COMPLETE CBC W/AUTO DIFF WBC: CPT | Performed by: SURGERY

## 2018-08-02 PROCEDURE — 83735 ASSAY OF MAGNESIUM: CPT | Performed by: SURGERY

## 2018-08-02 PROCEDURE — 74011250637 HC RX REV CODE- 250/637: Performed by: SURGERY

## 2018-08-02 PROCEDURE — 36415 COLL VENOUS BLD VENIPUNCTURE: CPT | Performed by: SURGERY

## 2018-08-02 PROCEDURE — 77010033678 HC OXYGEN DAILY

## 2018-08-02 PROCEDURE — 94760 N-INVAS EAR/PLS OXIMETRY 1: CPT

## 2018-08-02 RX ORDER — AMOXICILLIN AND CLAVULANATE POTASSIUM 875; 125 MG/1; MG/1
1 TABLET, FILM COATED ORAL 2 TIMES DAILY
Qty: 20 TAB | Refills: 0 | Status: SHIPPED | OUTPATIENT
Start: 2018-08-02

## 2018-08-02 RX ORDER — OXYCODONE AND ACETAMINOPHEN 5; 325 MG/1; MG/1
1-2 TABLET ORAL
Qty: 25 TAB | Refills: 0 | Status: SHIPPED | OUTPATIENT
Start: 2018-08-02

## 2018-08-02 RX ADMIN — DOCUSATE SODIUM 100 MG: 100 CAPSULE, LIQUID FILLED ORAL at 08:55

## 2018-08-02 RX ADMIN — Medication 10 ML: at 06:38

## 2018-08-02 RX ADMIN — KETOROLAC TROMETHAMINE 15 MG: 30 INJECTION, SOLUTION INTRAMUSCULAR at 06:38

## 2018-08-02 RX ADMIN — HEPARIN SODIUM 5000 UNITS: 5000 INJECTION INTRAVENOUS; SUBCUTANEOUS at 02:44

## 2018-08-02 RX ADMIN — OXYCODONE HYDROCHLORIDE AND ACETAMINOPHEN 1 TABLET: 5; 325 TABLET ORAL at 06:38

## 2018-08-02 NOTE — PROGRESS NOTES
General Surgery Progress Note S: Passing flatus. Pain controlled on current regimen. Off supplemental oxygen. Patient Vitals for the past 24 hrs: 
 Temp Pulse Resp BP SpO2  
08/02/18 0736 98.4 °F (36.9 °C) (!) 110 18 120/58 91 % 08/02/18 0400 98.5 °F (36.9 °C) 94 19 145/67 97 % 08/02/18 0302 - - - - 97 % 08/02/18 0000 98.6 °F (37 °C) (!) 110 18 149/84 95 % 08/01/18 2012 99.7 °F (37.6 °C) 100 20 133/86 92 % 08/01/18 1511 98.5 °F (36.9 °C) 97 24 108/74 97 % 08/01/18 1158 - - - - 99 % 08/01/18 1111 98.9 °F (37.2 °C) (!) 107 22 116/75 93 % 08/01/18 1012 - - - - 96 % Date 08/01/18 0700 - 08/02/18 7718 08/02/18 0700 - 08/03/18 6960 Shift 9069-9391 4269-6514 24 Hour Total 9012-0717 5798-1661 24 Hour Total  
I 
N 
T 
A 
K 
E 
 I.V. 
(mL/kg/hr) 770.8 
(0.7)  770.8 
(0.3) Volume (lactated Ringers infusion) 770.8  770.8 Shift Total 
(mL/kg) 770.8 
(8.1)  770.8 
(8.1) O 
U T 
P 
U [facility] Shift Total 
(mL/kg) .8  770.8 Weight (kg) 95.3 95.3 95.3 95.3 95.3 95.3 Physical Exam: 
 
 
General: NAD, A&Ox3 Resp: CTAB 
CV:Tachycardia, regular rhythm Abdomen: soft, distended, appropriately tender. Incisions without signs of infection Extremity: warm, no edema Lab Results Component Value Date/Time WBC 12.4 (H) 08/02/2018 04:14 AM  
 HGB 12.7 08/02/2018 04:14 AM  
 HCT 37.1 08/02/2018 04:14 AM  
 PLATELET 644 (L) 65/78/2570 04:14 AM  
 MCV 90.5 08/02/2018 04:14 AM  
 
 
Lab Results Component Value Date/Time  Sodium 137 08/02/2018 04:14 AM  
 Potassium 3.6 08/02/2018 04:14 AM  
 Chloride 99 08/02/2018 04:14 AM  
 CO2 30 08/02/2018 04:14 AM  
 Anion gap 8 08/02/2018 04:14 AM  
 Glucose 119 (H) 08/02/2018 04:14 AM  
 BUN 18 08/02/2018 04:14 AM  
 Creatinine 1.31 (H) 08/02/2018 04:14 AM  
 BUN/Creatinine ratio 14 08/02/2018 04:14 AM  
 GFR est AA >60 08/02/2018 04:14 AM  
 GFR est non-AA 58 (L) 08/02/2018 04:14 AM  
 Calcium 8.1 (L) 08/02/2018 04:14 AM  
 
  
No results found for: INR, PTMR, PTP, PT1, PT2 
 
 
 
A/P: 
47year-old male POD 2 s/p laparoscopic appendectomy. Continue Percocet changed to q4 hours. Added Toradol. IS, RT c/s Regular diet as tolerated Colace Will discharge on Augmentin for 10 days. Per Dr. Rocky Nichols appendix was gangrenous Heparin for DVT chemoprophylaxis May shower Ambulate, Activity ad elie Appropriate for discharge home with 2 week outpatient follow-up Dr. Rocky Ellison MD

## 2018-08-02 NOTE — PROGRESS NOTES
8/2/2018 
8:56 AM 
Pt discharge noted. CM reviewed EMR. No discharge service needs indicated. Pt's wife will provide transport upon discharge.

## 2018-08-02 NOTE — DISCHARGE SUMMARY
Discharge Summary     Patient: Alexi Stahl MRN: [id number]  SSN: [id number]    YOB: 1965  Age: 47 y.o. Sex: male       Admit Date: 7/31/2018    Discharge Date: 8/2/2018      Admission Diagnoses: Acute appendicitis, unspecified acute appendicitis type [K35.80]    Discharge Diagnoses:   Problem List as of 8/2/2018  Never Reviewed          Codes Class Noted - Resolved    Appendicitis ICD-10-CM: K37  ICD-9-CM: 782  7/31/2018 - Present               Discharge Condition: Good    Hospital Course: 47year-old male admitted with acute appendicitis, underwent laparoscopic appendectomy by Dr. Myron Kaplan on day of admission. Uncomplicated post-operative course. Was discharged home on 10 days of Augmentin. Was instructed to follow-up with Dr. Myron Kaplan in 2 weeks. Consults: None    Significant Diagnostic Studies: None    Disposition: home    Discharge Medications:   Current Discharge Medication List      START taking these medications    Details   amoxicillin-clavulanate (AUGMENTIN) 875-125 mg per tablet Take 1 Tab by mouth two (2) times a day. Qty: 20 Tab, Refills: 0    Associated Diagnoses: Acute appendicitis with localized peritonitis      oxyCODONE-acetaminophen (PERCOCET) 5-325 mg per tablet Take 1-2 Tabs by mouth every four (4) hours as needed for Pain. Max Daily Amount: 12 Tabs. Qty: 25 Tab, Refills: 0    Associated Diagnoses: Acute appendicitis with localized peritonitis         CONTINUE these medications which have NOT CHANGED    Details   multivitamin (ONE A DAY) tablet Take 1 Tab by mouth daily.          STOP taking these medications       acetaminophen (TYLENOL) 325 mg tablet Comments:   Reason for Stopping:               Activity: See surgical instructions  Diet: Regular Diet  Wound Care: None needed    Follow-up Appointments   Procedures    FOLLOW UP VISIT Appointment in: Two Weeks     Standing Status:   Standing     Number of Occurrences:   1     Order Specific Question: Appointment in     Answer:    Two Weeks       Signed By: India Apple MD     August 2, 2018

## 2018-08-02 NOTE — DISCHARGE INSTRUCTIONS
PATIENT INSTRUCTIONS  APPENDIX SURGERY  (Appendectomy)     FOLLOW-UP: Please make an appointment with your physician in 2 week(s). Call your physician immediately if you have any fevers greater than 102.5, drainage from your wound that is not clear or looks infected, persistent bleeding, increasing abdominal pain, problems urinating, or persistent nausea/vomiting. You should be aware that you may have shoulder pain after surgery and that this will progressively go away. This is called 'referred pain' and is caused by gas that may be trapped under the diaphragm from the surgery, especially if it was performed laparoscopically through mini-incisions. This gas will progressively get reabsorbed by your body.      WOUND CARE INSTRUCTIONS:  Dermabond liquid skin bandage applied. You may shower. Do not scrape off. Once the wound has quit draining you may leave it open to air. If clothing rubs against the wound or causes irritation and the wound is not draining you may cover it with a dry dressing during the daytime. Try to keep the wound dry and avoid ointments on the wound unless directed to do so. If the wound becomes bright red and painful or starts to drain infected material that is not clear, please contact your physician immediately. You should also call if you begin to drain fluid that is thin and greenish-brown from the wound and appears to look like bile. If the wound though is mildly pink and has a thick firm ridge underneath it, this is normal, and is referred to as a healing ridge. This will resolve over the next 4-6 weeks.     DIET: You may eat any foods that you can tolerate. It is a good idea to eat a high fiber diet and take in plenty of fluids to prevent constipation. If you do become constipated you may want to take a mild laxative or take ducolax tablets on a daily basis until your bowel habits are regular. Constipation can be very uncomfortable, along with straining, after recent abdominal surgery.  Can take Miralax 1 cap full daily for constipation.     ACTIVITY: You are encouraged to cough and deep breath or use your incentive spirometer if you were given one, every 15-30 minutes when awake. This will help prevent respiratory complications and low grade fevers post-operatively. You may want to hug a pillow when coughing and sneezing to add additional support to the surgical area(s) which will decrease pain during these times. You are encouraged to walk and engage in light activity for the next two weeks. You should not lift more than 20 pounds during this time frame as it could put you at increased risk for a post-operative hernia. Twenty pounds is roughly equivalent to a plastic bag of groceries.      MEDICATIONS: Try to take narcotic medications and anti-inflammatory medications, such as tylenol, ibuprofen, naprosyn, etc., with food. This will minimize stomach upset from the medication. Should you develop nausea and vomiting from the pain medication, or develop a rash, please discontinue the medication and contact your physician. You should not drive, make important decisions, or operate machinery when taking narcotic pain medication.     QUESTIONS: Please feel free to call your physician or the hospital  if you have any questions, and they will be glad to assist you.

## 2018-08-09 ENCOUNTER — INPATIENT (INPATIENT)
Facility: HOSPITAL | Age: 53
LOS: 3 days | Discharge: HOME | End: 2018-08-13
Attending: FAMILY MEDICINE | Admitting: FAMILY MEDICINE
Payer: COMMERCIAL

## 2018-08-09 ENCOUNTER — OUTPATIENT (OUTPATIENT)
Dept: OUTPATIENT SERVICES | Facility: HOSPITAL | Age: 53
LOS: 1 days | End: 2018-08-09

## 2018-08-09 PROCEDURE — 71045 X-RAY EXAM CHEST 1 VIEW: CPT | Mod: 26

## 2018-08-09 PROCEDURE — 76705 ECHO EXAM OF ABDOMEN: CPT | Mod: 26

## 2018-08-09 PROCEDURE — 74176 CT ABD & PELVIS W/O CONTRAST: CPT | Mod: 26

## 2018-08-09 PROCEDURE — 71275 CT ANGIOGRAPHY CHEST: CPT | Mod: 26

## 2018-08-09 PROCEDURE — 99284 EMERGENCY DEPT VISIT MOD MDM: CPT

## 2018-08-10 ENCOUNTER — OUTPATIENT (OUTPATIENT)
Dept: OUTPATIENT SERVICES | Facility: HOSPITAL | Age: 53
LOS: 1 days | End: 2018-08-10

## 2018-08-10 PROCEDURE — 77012 CT SCAN FOR NEEDLE BIOPSY: CPT | Mod: 26

## 2018-08-10 PROCEDURE — 10160 PNXR ASPIR ABSC HMTMA BULLA: CPT

## 2018-08-11 ENCOUNTER — OUTPATIENT (OUTPATIENT)
Dept: OUTPATIENT SERVICES | Facility: HOSPITAL | Age: 53
LOS: 1 days | End: 2018-08-11

## 2018-08-12 ENCOUNTER — OUTPATIENT (OUTPATIENT)
Dept: OUTPATIENT SERVICES | Facility: HOSPITAL | Age: 53
LOS: 1 days | End: 2018-08-12

## 2018-08-13 ENCOUNTER — OUTPATIENT (OUTPATIENT)
Dept: OUTPATIENT SERVICES | Facility: HOSPITAL | Age: 53
LOS: 1 days | End: 2018-08-13

## 2024-10-04 ENCOUNTER — NON-APPOINTMENT (OUTPATIENT)
Age: 59
End: 2024-10-04

## (undated) DEVICE — SOL IRRIGATION INJ NACL 0.9% 500ML BTL

## (undated) DEVICE — RELOAD STPL H1-2.5X45MM VASC THN TISS WHT 6 ROW B FRM SGL

## (undated) DEVICE — DISSECTOR LAP DIA5MM BLNT TIP ENDOPATH

## (undated) DEVICE — RELOAD STPL SZ 0 L45MM DIA3.5MM 0DEG STD REG TISS BLU TI

## (undated) DEVICE — TROCAR ENDOSCP L100MM DIA5MM BLDELSS STBL SL THRD OPT VW

## (undated) DEVICE — NEEDLE HYPO 22GA L1.5IN BLK S STL HUB POLYPR SHLD REG BVL

## (undated) DEVICE — SUTURE MCRYL SZ 4-0 L27IN ABSRB UD L19MM PS-2 1/2 CIR PRIM Y426H

## (undated) DEVICE — (D)PREP SKN CHLRAPRP APPL 26ML -- CONVERT TO ITEM 371833

## (undated) DEVICE — SUTURE SZ 0 27IN 5/8 CIR UR-6  TAPER PT VIOLET ABSRB VICRYL J603H

## (undated) DEVICE — REM POLYHESIVE ADULT PATIENT RETURN ELECTRODE: Brand: VALLEYLAB

## (undated) DEVICE — LIGHT HANDLE: Brand: DEVON

## (undated) DEVICE — Device

## (undated) DEVICE — SLEEVE TRCR L100MM DIA5MM UNIV STBL FOR BLDELSS DIL TIP

## (undated) DEVICE — TROCAR ENDOSCP L100MM DIA12MM STBL SL BLDELSS ENDOPATH XCEL

## (undated) DEVICE — APPLICATOR BNDG 1MM ADH PREMIERPRO EXOFIN

## (undated) DEVICE — 3000CC GUARDIAN II: Brand: GUARDIAN

## (undated) DEVICE — INFECTION CONTROL KIT SYS

## (undated) DEVICE — NEEDLE INSUF L120MM DIA2MM DISP FOR PNEUMOPERI ENDOPATH

## (undated) DEVICE — DEVICE TRNSF SPIK STL 2008S] MICROTEK MEDICAL INC]

## (undated) DEVICE — DEVON™ KNEE AND BODY STRAP 60" X 3" (1.5 M X 7.6 CM): Brand: DEVON

## (undated) DEVICE — CUTTER ENDOSCP L340MM LIN ARTC SGL STROKE FIRING ENDOPATH

## (undated) DEVICE — SURGICAL PROCEDURE KIT GEN LAPAROSCOPY LF

## (undated) DEVICE — DRAPE,REIN 53X77,STERILE: Brand: MEDLINE

## (undated) DEVICE — KENDALL SCD EXPRESS SLEEVES, KNEE LENGTH, MEDIUM: Brand: KENDALL SCD

## (undated) DEVICE — STERILE POLYISOPRENE POWDER-FREE SURGICAL GLOVES: Brand: PROTEXIS